# Patient Record
Sex: FEMALE | Race: WHITE | NOT HISPANIC OR LATINO | Employment: FULL TIME
[De-identification: names, ages, dates, MRNs, and addresses within clinical notes are randomized per-mention and may not be internally consistent; named-entity substitution may affect disease eponyms.]

---

## 2024-07-15 ENCOUNTER — TRANSCRIBE ORDERS (OUTPATIENT)
Dept: OTHER | Age: 59
End: 2024-07-15

## 2024-07-15 DIAGNOSIS — L40.50 PSORIATIC ARTHRITIS (H): Primary | ICD-10-CM

## 2024-11-05 ENCOUNTER — TELEPHONE (OUTPATIENT)
Dept: RHEUMATOLOGY | Facility: CLINIC | Age: 59
End: 2024-11-05
Payer: COMMERCIAL

## 2024-11-05 PROBLEM — M21.41 BILATERAL PES PLANUS: Status: ACTIVE | Noted: 2021-06-15

## 2024-11-05 PROBLEM — M19.071 ARTHRITIS OF RIGHT FOOT: Status: ACTIVE | Noted: 2024-08-06

## 2024-11-05 PROBLEM — M21.42 BILATERAL PES PLANUS: Status: ACTIVE | Noted: 2021-06-15

## 2024-11-05 RX ORDER — POTASSIUM CHLORIDE 1500 MG/1
20 TABLET, EXTENDED RELEASE ORAL DAILY
COMMUNITY
Start: 2023-11-10

## 2024-11-05 RX ORDER — TRIAMCINOLONE ACETONIDE 5 MG/G
CREAM TOPICAL PRN
COMMUNITY
Start: 2024-09-30

## 2024-11-05 NOTE — TELEPHONE ENCOUNTER
I called Ms. Albrecht to gather some information prior to her upcoming Rheumatology appointment.    She is referred for PsA/ RA.  She reports she saw Dr. Skinner at [facility] in [place] approximately 4 years ago.  She said they told her she didn't have psoriasis, but rather has atopic dermatitis.      She has never seen a rheumatologist or been told she had PsA or RA.  Her concern is that she has had pain and deformity of both feet, with her left foot.being the most problematic.  She feels this has been exacerbated by some severe emotional stressors she's suffered recently.      Orthopedics has told her she likely has OA in the foot, but they (and her PCP) have suggested she have a rheumatology workup to rule out an inflammatory process.  They are asking her to have this done prior to proceeding with surgery on her left foot.      She denies pain or swelling in any other joints. She said she has pictures of the changes she's been experiencing in her foot; she will bring them to her visit with us.

## 2024-11-05 NOTE — PROGRESS NOTES
Rheumatology Clinic Visit  [facility]  CRISTIAN Bradley     Diego Albrecht MRN# [id number]   YOB: 1965 Age: 59 year old   Date of Visit: 11/12/2024  Primary care provider: Wegener, Brita L          Assessment and Plan:     1.  Deformity of right foot    Patient presents today for an initial evaluation for deformity of her right foot.  She has been working with orthopedics and x-ray shows dislocation of her joint as well as severe osteoarthritis.  She is planning for surgery but prior to having surgery done it was recommended that she have an evaluation with rheumatology to verify that this is not an inflammatory condition.  She does have plaques on her skin but states that she had a biopsy done that showed atopic dermatitis.  She has no known history of psoriasis.  Physical examination today does show significant deformities to both of her feet significantly worse on the right compared to the left.  No dactylitis noted.  No synovitis on MCPs or PIPs.  Full fist formation.    Will check her for rheumatoid arthritis.  She had a negative CRP, sed rate and IBETH in January.  Will recheck the inflammatory markers.  Discussed with the patient that for further evaluation could consider an MRI.  She would like to wait on this at this time.  I will contact the patient with the results of the labs once they are complete.  Her skin is suspicious for psoriasis but a biopsy showed atopic dermatitis per patient report.  Could always have her see dermatology again for a second biopsy if needed.    Plan:     Schedule follow-up with Katrina Pro PA-C depending on results of labs  Imaging: consider MRI right foot  Labs: CRP, Sed Rate, CCP antibody, Rheumatoid Factor    CRISTIAN Bradley  Rheumatology         History of Present Illness:   Diego Albrecht presents for evaluation of joint pain.      She has seen orthopedics for her right foot. She has an enlargement along the medial aspect. She  states that before she had the COVID vaccine she did not have any issues with her foot. She states that shortly after she got the vaccination, her foot started to change. She has arthritis but does not have major pain. With cold damp weather, she may have some aches, but nothing severe. She has severe flat feet, and was told as a child she would likely have osteoarthritis. Each day after the vaccination she would see a change in the ankle.     Mother had pancreatic cancer that she did not know about. She passed away 10 days after her diagnosis. The pain of losing her mother was difficult and after that she started to have skin changes. 4 years after her mother passed, her sister was diagnosed with a rare form of cancer and she passed away and her father passed away. She states that losing her family has been very difficult and has manifested on her skin. It gets worse in the winter. She has seen dermatology and was diagnosed via biopsy with atopic dermatitis. With changes with her skin, she wondered if this is contributing to her joints. She had blood work done that was normal.          Review of Systems:     Constitutional: negative  Skin: as above  Eyes: negative  Ears/Nose/Throat: negative  Respiratory: No shortness of breath, dyspnea on exertion, cough, or hemoptysis  Cardiovascular: negative  Gastrointestinal: negative  Genitourinary: negative  Musculoskeletal: as above  Neurologic: negative  Psychiatric: negative  Hematologic/Lymphatic/Immunologic: negative  Endocrine: negative         Active Problem List:     Patient Active Problem List    Diagnosis Date Noted    Arthritis of right foot 08/06/2024     Priority: Medium    Bilateral pes planus 06/15/2021     Priority: Medium    Hypertension 08/08/2016     Priority: Medium            Past Medical History:   No past medical history on file.  No past surgical history on file.         Social History:     Social History     Socioeconomic History    Marital status:       Spouse name: Not on file    Number of children: Not on file    Years of education: Not on file    Highest education level: Not on file   Occupational History    Not on file   Tobacco Use    Smoking status: Never    Smokeless tobacco: Never   Substance and Sexual Activity    Alcohol use: Yes     Comment: 3-4 drinks a week    Drug use: Never    Sexual activity: Not on file   Other Topics Concern    Not on file   Social History Narrative    Not on file     Social Drivers of Health     Financial Resource Strain: Low Risk  (7/11/2023)    [facility]    Financial Resource Strain     Difficulty of Paying Living Expenses: 3     Difficulty of Paying Living Expenses: Not on file   Food Insecurity: No Food Insecurity (7/11/2023)    [facility]    Food Insecurity     Worried About Running Out of Food in the Last Year: 1   Transportation Needs: No Transportation Needs (7/11/2023)    Received from [facility]    Transportation Needs     Lack of Transportation (Medical): 1   Physical Activity: Not on file   Stress: Not on file   Social Connections: Unknown (7/11/2024)    [facility]    Social Connections     Frequency of Communication with Friends and Family: Not on file   Interpersonal Safety: Not on file   Housing Stability: Low Risk  (7/11/2023)    Received from [facility]    Housing Stability     Unable to Pay for Housing in the Last Year: 1          Family History:     Family History   Problem Relation Age of Onset    Pancreatic Cancer Mother     Other - See Comments Sister         appendectomy carcinoma    Diabetes Brother     Hypertension Brother     Breast Cancer Other     Breast Cancer Maternal Cousin             Allergies:     Allergies   Allergen Reactions    Triaminicin [Advanced Decongestant D] Rash     Other Drug Allergy (See Comments) Rash            Medications:     Current Outpatient Medications   Medication Sig Dispense Refill    potassium chloride bailey ER (KLOR-CON M20) 20 MEQ CR tablet Take 20 mEq by mouth daily.      triamcinolone (ARISTOCORT HP) 0.5 % external cream Apply topically as needed.      TRIAMTERENE-HCTZ 75-50 MG OR TABS 1 TABLET ORALLY DAILY      EPINEPHrine (EPIPEN) 0.3 MG/0.3ML injection Inject 0.3 mLs (0.3 mg) into the muscle once as needed for anaphylaxis (Patient not taking: Reported on 11/12/2024) 0.6 mL 0            Physical Exam:   Blood pressure (!) 160/88, pulse 105, weight 76.2 kg (168 lb), SpO2 99%.  Wt Readings from Last 6 Encounters:   11/12/24 76.2 kg (168 lb)     Constitutional: well-developed, appearing stated age; cooperative  Eyes: nl conjunctiva, sclera  ENT: nl external ears, nose, hearing, lips, teeth  Neck: no mass or thyroid enlargement  Resp: No shortness of breath with normal conversation  Lymph: no cervical, supraclavicular or epitrochlear nodes  MS: Severe deformity of the right foot.  Mild deformity of the left foot.  No dactylitis or synovitis noted.  Skin: no nail pitting, erythematous plaques to left elbow, nails broke down to bed due to patient biting nails  Psych: nl judgement, orientation, memory, affect.           Data:   Imaging:  X-rays: Five views of the right foot are reviewed from the [employer] system. These are dated 08/31/2023. There is complete dislocation of the subtalar joint on the right. The tibiotalar joint is actually congruent. No significant arthritis. Increased Meary's angle. Complete talar head uncoverage. The deformity is noted to have progressed since the radiographs dated 2021.     Laboratory:  01/15/2024  Creatinine 0.84, GFR 80  IBETH negative  CRP less than 0.3  Sed rate 8

## 2024-11-12 ENCOUNTER — OFFICE VISIT (OUTPATIENT)
Dept: RHEUMATOLOGY | Facility: CLINIC | Age: 59
End: 2024-11-12
Attending: PHYSICIAN ASSISTANT
Payer: COMMERCIAL

## 2024-11-12 VITALS
OXYGEN SATURATION: 99 % | WEIGHT: 168 LBS | SYSTOLIC BLOOD PRESSURE: 160 MMHG | DIASTOLIC BLOOD PRESSURE: 88 MMHG | HEART RATE: 105 BPM

## 2024-11-12 DIAGNOSIS — M21.961 DEFORMITY, FOOT, RIGHT: Primary | ICD-10-CM

## 2024-11-12 LAB
CRP SERPL-MCNC: <3 MG/L
ERYTHROCYTE [SEDIMENTATION RATE] IN BLOOD BY WESTERGREN METHOD: 7 MM/HR (ref 0–30)
RHEUMATOID FACT SERPL-ACNC: <10 IU/ML

## 2024-11-12 PROCEDURE — 86200 CCP ANTIBODY: CPT | Performed by: PHYSICIAN ASSISTANT

## 2024-11-12 PROCEDURE — 85652 RBC SED RATE AUTOMATED: CPT | Performed by: PHYSICIAN ASSISTANT

## 2024-11-12 PROCEDURE — 36415 COLL VENOUS BLD VENIPUNCTURE: CPT | Performed by: PHYSICIAN ASSISTANT

## 2024-11-12 PROCEDURE — 86431 RHEUMATOID FACTOR QUANT: CPT | Performed by: PHYSICIAN ASSISTANT

## 2024-11-12 PROCEDURE — 99204 OFFICE O/P NEW MOD 45 MIN: CPT | Performed by: PHYSICIAN ASSISTANT

## 2024-11-12 PROCEDURE — 86140 C-REACTIVE PROTEIN: CPT | Performed by: PHYSICIAN ASSISTANT

## 2024-11-12 NOTE — PATIENT INSTRUCTIONS
After Visit Instructions:     Thank you for coming to [facility] for your care. It is my goal to partner with you to help you reach your optimal state of health.       Plan:     Schedule follow-up with Katrina Pro PA-C depending on results of labs  Imaging: consider MRI right foot  Labs: CRP, Sed Rate, CCP antibody, Rheumatoid Factor    Katrina Pro PA-C  [facility]  [facility]    Contact information: [facility]  Clinic Number:  [phone]  Please call or send a Cybera message with any questions about your care

## 2024-11-12 NOTE — NURSING NOTE
Chief Complaint   Patient presents with    Consult     PsA.  Referral by Brita Wegener, PA-C (7/2/24).  *will need to sign for records from dermatology*       Vitals:    11/12/24 0951   Weight: 76.2 kg (168 lb)     Wt Readings from Last 1 Encounters:   11/12/24 76.2 kg (168 lb)       Katie Martinez MA

## 2024-11-13 LAB — CCP AB SER IA-ACNC: 2 U/ML

## 2025-07-23 ENCOUNTER — TRANSCRIBE ORDERS (OUTPATIENT)
Dept: VASCULAR SURGERY | Facility: CLINIC | Age: 60
End: 2025-07-23
Payer: COMMERCIAL

## 2025-07-23 DIAGNOSIS — S81.801A WOUND OF RIGHT LOWER EXTREMITY, INITIAL ENCOUNTER: Primary | ICD-10-CM

## 2025-07-30 ENCOUNTER — HOSPITAL ENCOUNTER (OUTPATIENT)
Dept: ULTRASOUND IMAGING | Facility: CLINIC | Age: 60
Discharge: HOME | End: 2025-07-30
Payer: COMMERCIAL

## 2025-07-30 DIAGNOSIS — S81.801A WOUND OF RIGHT LOWER EXTREMITY, INITIAL ENCOUNTER: ICD-10-CM

## 2025-07-30 PROCEDURE — 93922 UPR/L XTREMITY ART 2 LEVELS: CPT

## 2025-08-02 ENCOUNTER — HEALTH MAINTENANCE LETTER (OUTPATIENT)
Age: 60
End: 2025-08-02

## 2025-08-12 PROBLEM — M21.961 FOOT DEFORMITY, ACQUIRED, RIGHT: Status: ACTIVE | Noted: 2025-03-04

## 2025-08-12 PROBLEM — L40.50 PSORIATIC ARTHRITIS (H): Status: ACTIVE | Noted: 2024-04-12

## 2025-08-12 RX ORDER — HYDROCODONE BITARTRATE AND ACETAMINOPHEN 5; 325 MG/1; MG/1
1 TABLET ORAL DAILY PRN
COMMUNITY
Start: 2025-04-28

## 2025-08-12 RX ORDER — PROMETHAZINE HYDROCHLORIDE 25 MG/1
TABLET ORAL
COMMUNITY
Start: 2025-05-29

## 2025-08-12 RX ORDER — ONDANSETRON 4 MG/1
4 TABLET, ORALLY DISINTEGRATING ORAL
COMMUNITY
Start: 2025-02-28

## 2025-08-12 RX ORDER — DULOXETIN HYDROCHLORIDE 20 MG/1
20 CAPSULE, DELAYED RELEASE ORAL DAILY
COMMUNITY
Start: 2025-03-03 | End: 2026-03-03

## 2025-08-12 RX ORDER — AMOXICILLIN AND CLAVULANATE POTASSIUM 500; 125 MG/1; MG/1
TABLET, FILM COATED ORAL
COMMUNITY
Start: 2025-07-24

## 2025-08-12 RX ORDER — LORAZEPAM 0.5 MG/1
0.5 TABLET ORAL EVERY 6 HOURS PRN
COMMUNITY

## 2025-08-12 RX ORDER — AMOXICILLIN 250 MG
2 CAPSULE ORAL
COMMUNITY
Start: 2025-03-03

## 2025-08-12 RX ORDER — ASPIRIN 325 MG
TABLET ORAL
COMMUNITY
Start: 2025-02-28

## 2025-08-12 RX ORDER — OXYCODONE HYDROCHLORIDE 5 MG/1
5-10 TABLET ORAL
COMMUNITY
Start: 2025-03-28

## 2025-08-12 RX ORDER — TIZANIDINE 2 MG/1
6 TABLET ORAL EVERY 8 HOURS
COMMUNITY
Start: 2025-04-14

## 2025-08-12 RX ORDER — HYDROXYZINE PAMOATE 25 MG/1
25-50 CAPSULE ORAL
COMMUNITY
Start: 2025-04-16

## 2025-08-12 RX ORDER — PSEUDOEPHED/ACETAMINOPH/DIPHEN 30MG-500MG
TABLET ORAL
COMMUNITY

## 2025-08-13 ENCOUNTER — OFFICE VISIT (OUTPATIENT)
Dept: VASCULAR SURGERY | Facility: CLINIC | Age: 60
End: 2025-08-13
Payer: COMMERCIAL

## 2025-08-13 VITALS
TEMPERATURE: 97.8 F | RESPIRATION RATE: 18 BRPM | DIASTOLIC BLOOD PRESSURE: 88 MMHG | SYSTOLIC BLOOD PRESSURE: 159 MMHG | HEART RATE: 83 BPM | OXYGEN SATURATION: 100 %

## 2025-08-13 DIAGNOSIS — I87.303 VENOUS HYPERTENSION OF BOTH LOWER EXTREMITIES: ICD-10-CM

## 2025-08-13 DIAGNOSIS — T81.30XA WOUND DEHISCENCE: ICD-10-CM

## 2025-08-13 DIAGNOSIS — S81.801A WOUND OF RIGHT LOWER EXTREMITY, INITIAL ENCOUNTER: ICD-10-CM

## 2025-08-13 DIAGNOSIS — I89.0 SECONDARY LYMPHEDEMA: ICD-10-CM

## 2025-08-13 DIAGNOSIS — L30.9 DERMATITIS: Primary | ICD-10-CM

## 2025-08-13 PROCEDURE — 99215 OFFICE O/P EST HI 40 MIN: CPT | Performed by: NURSE PRACTITIONER

## 2025-08-13 PROCEDURE — 11042 DBRDMT SUBQ TIS 1ST 20SQCM/<: CPT | Performed by: NURSE PRACTITIONER

## 2025-08-13 RX ORDER — CLOTRIMAZOLE AND BETAMETHASONE DIPROPIONATE 10; .64 MG/G; MG/G
CREAM TOPICAL DAILY
Qty: 45 G | Refills: 3 | Status: SHIPPED | OUTPATIENT
Start: 2025-08-13

## 2025-08-13 ASSESSMENT — PAIN SCALES - GENERAL: PAINLEVEL_OUTOF10: NO PAIN (0)

## 2025-08-14 ENCOUNTER — TELEPHONE (OUTPATIENT)
Dept: VASCULAR SURGERY | Facility: CLINIC | Age: 60
End: 2025-08-14
Payer: COMMERCIAL